# Patient Record
Sex: MALE | Race: WHITE | NOT HISPANIC OR LATINO
[De-identification: names, ages, dates, MRNs, and addresses within clinical notes are randomized per-mention and may not be internally consistent; named-entity substitution may affect disease eponyms.]

---

## 2023-01-03 PROBLEM — Z00.00 ENCOUNTER FOR PREVENTIVE HEALTH EXAMINATION: Status: ACTIVE | Noted: 2023-01-03

## 2023-01-11 ENCOUNTER — APPOINTMENT (OUTPATIENT)
Dept: ORTHOPEDIC SURGERY | Facility: CLINIC | Age: 78
End: 2023-01-11
Payer: MEDICARE

## 2023-01-11 VITALS — HEIGHT: 68 IN | BODY MASS INDEX: 35.16 KG/M2 | WEIGHT: 232 LBS

## 2023-01-11 DIAGNOSIS — E78.00 PURE HYPERCHOLESTEROLEMIA, UNSPECIFIED: ICD-10-CM

## 2023-01-11 DIAGNOSIS — S46.912A STRAIN OF UNSPECIFIED MUSCLE, FASCIA AND TENDON AT SHOULDER AND UPPER ARM LEVEL, LEFT ARM, INITIAL ENCOUNTER: ICD-10-CM

## 2023-01-11 PROCEDURE — 99213 OFFICE O/P EST LOW 20 MIN: CPT

## 2023-01-11 PROCEDURE — 73030 X-RAY EXAM OF SHOULDER: CPT | Mod: LT

## 2023-01-11 NOTE — PHYSICAL EXAM
[4 ___] : forward flexion 4[unfilled]/5 [4___] : abduction 4[unfilled]/5 [Left] : left shoulder [] : no erythema [5___] : internal rotation 5[unfilled]/5 [Type 3 acromion] : Type 3 acromion [TWNoteComboBox7] : active forward flexion 160 degrees [de-identified] : active abduction 110 degrees [TWNoteComboBox6] : internal rotation L5 [de-identified] : external rotation 55 degrees

## 2023-01-11 NOTE — ASSESSMENT
[FreeTextEntry1] : acute onset of left shoulder pain after a trip and fall down the stairs on 12/24/22.  reports to landing on the shoulder at that time.  impingement present.  concern for cuff tear.  narrowing SA space.  type 3 acromion.\par \par history of triple bypass 2019

## 2023-01-11 NOTE — DISCUSSION/SUMMARY
[de-identified] : Progress Note completed by Jaky Watts PA-C\par * Dr. Mcgraw -- The documentation recorded in this note accurately reflects the decisions made by me during this visit.

## 2023-01-11 NOTE — HISTORY OF PRESENT ILLNESS
[6] : 6 [2] : 2 [Dull/Aching] : dull/aching [Localized] : localized [Intermittent] : intermittent [Meds] : meds [de-identified] : 1/11/23:  acute onset of left shoulder pain after a trip and fall down the stairs on 12/24/22.  reports to landing on the shoulder at that time.  [] : no [FreeTextEntry1] : Lt shoulder [FreeTextEntry3] : 12/24/22 [FreeTextEntry5] : Pt reports he was going down the stairs and tripped landing on his left shoulder. Pt reports pain with ROM. denies prior shoulder issues in the past.  [de-identified] : elevation, raising arms above the head [de-identified] : [facility]  [de-identified] : XRAY

## 2023-01-15 ENCOUNTER — FORM ENCOUNTER (OUTPATIENT)
Age: 78
End: 2023-01-15

## 2023-01-16 ENCOUNTER — APPOINTMENT (OUTPATIENT)
Dept: MRI IMAGING | Facility: CLINIC | Age: 78
End: 2023-01-16
Payer: MEDICARE

## 2023-01-16 PROCEDURE — 73221 MRI JOINT UPR EXTREM W/O DYE: CPT | Mod: LT,MH

## 2023-01-25 ENCOUNTER — APPOINTMENT (OUTPATIENT)
Dept: ORTHOPEDIC SURGERY | Facility: CLINIC | Age: 78
End: 2023-01-25
Payer: MEDICARE

## 2023-01-25 VITALS — WEIGHT: 232 LBS | HEIGHT: 68 IN | BODY MASS INDEX: 35.16 KG/M2

## 2023-01-25 PROCEDURE — 99214 OFFICE O/P EST MOD 30 MIN: CPT

## 2023-01-25 NOTE — DATA REVIEWED
[MRI] : MRI [Left] : left [Shoulder] : shoulder [Report was reviewed and noted in the chart] : The report was reviewed and noted in the chart [I reviewed the films/CD and agree] : I reviewed the films/CD and agree

## 2023-01-25 NOTE — PHYSICAL EXAM
[4 ___] : forward flexion 4[unfilled]/5 [Left] : left shoulder [Type 3 acromion] : Type 3 acromion [] : tenderness at anterior shoulder [3___] : external rotation 3[unfilled]/5 [4___] : internal rotation 4[unfilled]/5 [TWNoteComboBox7] : active forward flexion 130 degrees [TWNoteComboBox4] : passive forward flexion 150 degrees [de-identified] : active abduction 150 degrees [TWNoteComboBox6] : internal rotation L5 [de-identified] : external rotation 30 degrees

## 2023-01-25 NOTE — HISTORY OF PRESENT ILLNESS
[6] : 6 [Throbbing] : throbbing [Retired] : Work status: retired [2] : 2 [Dull/Aching] : dull/aching [Localized] : localized [Intermittent] : intermittent [Meds] : meds [de-identified] : 1/11/23:  acute onset of left shoulder pain after a trip and fall down the stairs on 12/24/22.  reports to landing on the shoulder at that time. \par 1/25/23:  left shoulder pain persists. [] : no [FreeTextEntry1] : Lt shoulder [FreeTextEntry3] : 12/24/22 [FreeTextEntry5] : Pt reports he was going down the stairs and tripped landing on his left shoulder. Pt reports pain with ROM. denies prior shoulder issues in the past.  [de-identified] : elevation, raising arms above the head [de-identified] [facility]  [de-identified] : XRAY [de-identified] : pain meds

## 2023-01-25 NOTE — ASSESSMENT
[FreeTextEntry1] : acute onset of left shoulder pain after a trip and fall down the stairs on 12/24/22.  reports to landing on the shoulder at that time.  impingement present. mri 2023 shows retracted cuff tear,  dislocated biceps tendon.  looks like subscap tear as well.  discussed all options.  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  discussed failure rate and also discussed we have short window if we are going to try to fix.  risks of failure discussed.\par \par history of triple bypass 2019. on blood thinners.

## 2023-02-15 ENCOUNTER — APPOINTMENT (OUTPATIENT)
Dept: ORTHOPEDIC SURGERY | Facility: CLINIC | Age: 78
End: 2023-02-15
Payer: MEDICARE

## 2023-02-15 PROCEDURE — L3670: CPT | Mod: LT

## 2023-02-23 ENCOUNTER — LABORATORY RESULT (OUTPATIENT)
Age: 78
End: 2023-02-23

## 2023-02-27 ENCOUNTER — APPOINTMENT (OUTPATIENT)
Age: 78
End: 2023-02-27
Payer: MEDICARE

## 2023-02-27 PROCEDURE — 29826 SHO ARTHRS SRG DECOMPRESSION: CPT | Mod: LT

## 2023-02-27 PROCEDURE — 29827 SHO ARTHRS SRG RT8TR CUF RPR: CPT | Mod: LT

## 2023-02-27 RX ORDER — OXYCODONE AND ACETAMINOPHEN 5; 325 MG/1; MG/1
5-325 TABLET ORAL
Qty: 42 | Refills: 0 | Status: ACTIVE | COMMUNITY
Start: 2023-02-27 | End: 1900-01-01

## 2023-03-08 ENCOUNTER — APPOINTMENT (OUTPATIENT)
Dept: ORTHOPEDIC SURGERY | Facility: CLINIC | Age: 78
End: 2023-03-08
Payer: MEDICARE

## 2023-03-08 ENCOUNTER — NON-APPOINTMENT (OUTPATIENT)
Age: 78
End: 2023-03-08

## 2023-03-08 VITALS — WEIGHT: 232 LBS | HEIGHT: 68 IN | BODY MASS INDEX: 35.16 KG/M2

## 2023-03-08 PROCEDURE — 99024 POSTOP FOLLOW-UP VISIT: CPT

## 2023-03-08 NOTE — ASSESSMENT
[FreeTextEntry1] : s/p left shoulder scope for subscap repair, cuff repair, proximal biceps tenotomy, labral winsome, sad and bursectomy on 2/27/23.  doing well.

## 2023-03-08 NOTE — DISCUSSION/SUMMARY
[de-identified] : Progress Note completed by Jaky Watts PA-C\par * Dr. Mcgraw -- The documentation recorded in this note accurately reflects the decisions made by me during this visit.

## 2023-03-08 NOTE — HISTORY OF PRESENT ILLNESS
[5] : 5 [2] : 2 [Dull/Aching] : dull/aching [Sharp] : sharp [Occasional] : occasional [Rest] : rest [Meds] : meds [] : Post Surgical Visit: yes [de-identified] : 3/8/23:  s/p left shoulder scope on 2/27//23.   [FreeTextEntry1] : left shoulder [de-identified] : lifting [de-identified] : 02/27/23 [de-identified] : shoulder surgery

## 2023-04-05 ENCOUNTER — APPOINTMENT (OUTPATIENT)
Dept: ORTHOPEDIC SURGERY | Facility: CLINIC | Age: 78
End: 2023-04-05
Payer: MEDICARE

## 2023-04-05 VITALS — HEIGHT: 68 IN | BODY MASS INDEX: 35.16 KG/M2 | WEIGHT: 232 LBS

## 2023-04-05 DIAGNOSIS — M75.102 UNSPECIFIED ROTATOR CUFF TEAR OR RUPTURE OF LEFT SHOULDER, NOT SPECIFIED AS TRAUMATIC: ICD-10-CM

## 2023-04-05 PROCEDURE — 99024 POSTOP FOLLOW-UP VISIT: CPT

## 2023-04-05 NOTE — PHYSICAL EXAM
[Left] : left shoulder [] : no erythema [FreeTextEntry3] : well healed surgical scars [TWNoteComboBox4] : passive forward flexion 95 degrees [TWNoteComboBox9] : passive abduction 85 degrees [TWNoteComboBox6] : internal rotation lateral hip [de-identified] : external rotation 5 degrees

## 2023-04-05 NOTE — ASSESSMENT
[FreeTextEntry1] : s/p left shoulder scope for subscap repair, cuff repair, proximal biceps tenotomy, labral winsome, sad and bursectomy on 2/27/23.  improving with pt.

## 2023-04-05 NOTE — DISCUSSION/SUMMARY
[de-identified] : Progress note completed by Jaky Watts PA-C\par *Dr. Mcgraw - The TONE assigned on this date is under my supervision and saw this patient independently.  I have reviewed the note and agree with the treatment provided.

## 2023-04-05 NOTE — HISTORY OF PRESENT ILLNESS
[Gradual] : gradual [5] : 5 [0] : 0 [Sharp] : sharp [Occasional] : occasional [] : Post Surgical Visit: yes [de-identified] : 3/8/23:  s/p left shoulder scope on 2/27/23.  \par 4/5/23:  follow up left shoulder.  doing well.  improving with pt.  [FreeTextEntry1] : rt shoulder [de-identified] : 2/27

## 2023-05-10 ENCOUNTER — APPOINTMENT (OUTPATIENT)
Dept: ORTHOPEDIC SURGERY | Facility: CLINIC | Age: 78
End: 2023-05-10
Payer: MEDICARE

## 2023-05-10 VITALS — WEIGHT: 232 LBS | HEIGHT: 68 IN | BODY MASS INDEX: 35.16 KG/M2

## 2023-05-10 PROCEDURE — 99024 POSTOP FOLLOW-UP VISIT: CPT

## 2023-05-10 NOTE — PHYSICAL EXAM
[Left] : left shoulder [4 ___] : forward flexion 4[unfilled]/5 [3___] : abduction 3[unfilled]/5 [4___] : internal rotation 4[unfilled]/5 [] : fatigue with strength testing [FreeTextEntry3] : well healed surgical scars [TWNoteComboBox7] : active forward flexion 140 degrees [TWNoteComboBox4] : passive forward flexion 145 degrees [de-identified] : active abduction 95 degrees [TWNoteComboBox9] : passive abduction 85 degrees [TWNoteComboBox6] : internal rotation sacrum [de-identified] : external rotation 40 degrees

## 2023-05-10 NOTE — ASSESSMENT
[FreeTextEntry1] : s/p left shoulder scope for subscap repair, cuff repair, proximal biceps tenotomy, labral winsome, sad and bursectomy on 2/27/23.    improving with pt but still some weakness.

## 2023-05-10 NOTE — DISCUSSION/SUMMARY
[de-identified] : Progress Note completed by Jaky Watts PA-C\par * Dr. Mcgraw -- The documentation recorded in this note accurately reflects the decisions made by me during this visit.

## 2023-05-10 NOTE — HISTORY OF PRESENT ILLNESS
[] : Post Surgical Visit: yes [Sharp] : sharp [Occasional] : occasional [Rest] : rest [Gradual] : gradual [5] : 5 [0] : 0 [de-identified] : 3/8/23:  s/p left shoulder scope on 2/27/23.  \par 4/5/23:  follow up left shoulder.  doing well.  improving with pt. \par 5/10/23:  follow up left shoulder.  improving with pt but still some weakness.  [FreeTextEntry1] : rt shoulder [de-identified] : lifting [de-identified] : pt [de-identified] : 2/27 [de-identified] : L SHOULDER SCOPE

## 2023-06-14 ENCOUNTER — APPOINTMENT (OUTPATIENT)
Dept: ORTHOPEDIC SURGERY | Facility: CLINIC | Age: 78
End: 2023-06-14
Payer: MEDICARE

## 2023-06-14 VITALS — BODY MASS INDEX: 35.16 KG/M2 | WEIGHT: 232 LBS | HEIGHT: 68 IN

## 2023-06-14 PROCEDURE — 99213 OFFICE O/P EST LOW 20 MIN: CPT

## 2023-06-14 NOTE — DISCUSSION/SUMMARY
[de-identified] : Progress Note completed by Jaky Watts PA-C\par * Dr. Mcgraw -- The documentation recorded in this note accurately reflects the decisions made by me during this visit.

## 2023-06-14 NOTE — HISTORY OF PRESENT ILLNESS
[2] : 2 [1] : 2 [Dull/Aching] : dull/aching [Occasional] : occasional [Rest] : rest [Physical therapy] : physical therapy [de-identified] : 3/8/23:  s/p left shoulder scope on 2/27/23.  \par 4/5/23:  follow up left shoulder.  doing well.  improving with pt. \par 5/10/23:  follow up left shoulder.  improving with pt but still some weakness. \par 6/14/23:  left shoulder pain improved but still weakness and stiffness. [FreeTextEntry1] : left shoulder  [de-identified] : lifting [de-identified] : pt

## 2023-06-14 NOTE — PHYSICAL EXAM
[Left] : left shoulder [4 ___] : forward flexion 4[unfilled]/5 [] : no erythema [4___] : abduction 4[unfilled]/5 [5___] : internal rotation 5[unfilled]/5 [FreeTextEntry3] : well healed surgical scars [TWNoteComboBox7] : active forward flexion 140 degrees [de-identified] : active abduction 95 degrees [TWNoteComboBox4] : passive forward flexion 160 degrees [TWNoteComboBox6] : internal rotation L1 [de-identified] : external rotation 40 degrees

## 2023-07-19 ENCOUNTER — APPOINTMENT (OUTPATIENT)
Dept: ORTHOPEDIC SURGERY | Facility: CLINIC | Age: 78
End: 2023-07-19
Payer: MEDICARE

## 2023-07-19 VITALS — BODY MASS INDEX: 35.16 KG/M2 | WEIGHT: 232 LBS | HEIGHT: 68 IN

## 2023-07-19 PROCEDURE — 99213 OFFICE O/P EST LOW 20 MIN: CPT

## 2023-07-19 NOTE — ASSESSMENT
[FreeTextEntry1] : s/p left shoulder scope for subscap repair, cuff repair, proximal biceps tenotomy, labral winsome, sad and bursectomy on 2/27/23.    improving with pt but still some weakness and tightness.

## 2023-07-19 NOTE — HISTORY OF PRESENT ILLNESS
[Dull/Aching] : dull/aching [2] : 2 [1] : 2 [Occasional] : occasional [Rest] : rest [Physical therapy] : physical therapy [de-identified] : 3/8/23:  s/p left shoulder scope on 2/27/23.  \par 4/5/23:  follow up left shoulder.  doing well.  improving with pt. \par 5/10/23:  follow up left shoulder.  improving with pt but still some weakness. \par 6/14/23:  left shoulder pain improved but still weakness and stiffness.\par 7/19/23:  left shoulder improving with pt.  [FreeTextEntry1] : left shoulder  [de-identified] : lifting [de-identified] : pt

## 2023-07-19 NOTE — DISCUSSION/SUMMARY
[de-identified] : Progress Note completed by Jaky Watts PA-C\par * Dr. Mcgraw -- The documentation recorded in this note accurately reflects the decisions made by me during this visit.

## 2023-07-19 NOTE — PHYSICAL EXAM
[Left] : left shoulder [4 ___] : forward flexion 4[unfilled]/5 [4___] : abduction 4[unfilled]/5 [5___] : internal rotation 5[unfilled]/5 [] : no tenderness to palpation [FreeTextEntry3] : well healed surgical scars [TWNoteComboBox7] : active forward flexion 160 degrees [TWNoteComboBox4] : passive forward flexion 165 degrees [de-identified] : active abduction 140 degrees [TWNoteComboBox6] : internal rotation L1 [de-identified] : external rotation 55 degrees

## 2023-08-30 ENCOUNTER — APPOINTMENT (OUTPATIENT)
Dept: ORTHOPEDIC SURGERY | Facility: CLINIC | Age: 78
End: 2023-08-30
Payer: MEDICARE

## 2023-08-30 VITALS — HEIGHT: 68 IN | BODY MASS INDEX: 35.16 KG/M2 | WEIGHT: 232 LBS

## 2023-08-30 PROCEDURE — 99213 OFFICE O/P EST LOW 20 MIN: CPT

## 2023-08-30 NOTE — PHYSICAL EXAM
[Left] : left shoulder [4 ___] : forward flexion 4[unfilled]/5 [4___] : abduction 4[unfilled]/5 [5___] : internal rotation 5[unfilled]/5 [] : no tenderness to palpation [FreeTextEntry3] : well healed surgical scars [TWNoteComboBox7] : active forward flexion 165 degrees [TWNoteComboBox4] : passive forward flexion 165 degrees [de-identified] : active abduction 140 degrees [TWNoteComboBox6] : internal rotation L3 [de-identified] : external rotation 40 degrees

## 2023-08-30 NOTE — HISTORY OF PRESENT ILLNESS
[2] : 2 [Dull/Aching] : dull/aching [Physical therapy] : physical therapy [Retired] : Work status: retired [1] : 2 [Occasional] : occasional [Rest] : rest [de-identified] : 3/8/23:  s/p left shoulder scope on 2/27/23.   4/5/23:  follow up left shoulder.  doing well.  improving with pt.  5/10/23:  follow up left shoulder.  improving with pt but still some weakness.  6/14/23:  left shoulder pain improved but still weakness and stiffness. 7/19/23:  left shoulder improving with pt.  8/30/23: improvement but still some stiffness. [] : no [FreeTextEntry1] : left shoulder  [de-identified] : lifting [de-identified] : pt

## 2023-10-25 ENCOUNTER — APPOINTMENT (OUTPATIENT)
Dept: ORTHOPEDIC SURGERY | Facility: CLINIC | Age: 78
End: 2023-10-25
Payer: MEDICARE

## 2023-10-25 VITALS — WEIGHT: 232 LBS | HEIGHT: 68 IN | BODY MASS INDEX: 35.16 KG/M2

## 2023-10-25 DIAGNOSIS — M75.42 IMPINGEMENT SYNDROME OF LEFT SHOULDER: ICD-10-CM

## 2023-10-25 DIAGNOSIS — M75.102 UNSPECIFIED ROTATOR CUFF TEAR OR RUPTURE OF LEFT SHOULDER, NOT SPECIFIED AS TRAUMATIC: ICD-10-CM

## 2023-10-25 PROCEDURE — 99213 OFFICE O/P EST LOW 20 MIN: CPT

## 2024-02-13 ENCOUNTER — NON-APPOINTMENT (OUTPATIENT)
Age: 79
End: 2024-02-13

## 2024-02-14 ENCOUNTER — NON-APPOINTMENT (OUTPATIENT)
Age: 79
End: 2024-02-14

## 2024-02-14 ENCOUNTER — APPOINTMENT (OUTPATIENT)
Dept: PULMONOLOGY | Facility: CLINIC | Age: 79
End: 2024-02-14
Payer: MEDICARE

## 2024-02-14 VITALS
TEMPERATURE: 97.7 F | HEART RATE: 81 BPM | OXYGEN SATURATION: 94 % | DIASTOLIC BLOOD PRESSURE: 83 MMHG | WEIGHT: 240 LBS | SYSTOLIC BLOOD PRESSURE: 135 MMHG | HEIGHT: 68 IN | BODY MASS INDEX: 36.37 KG/M2

## 2024-02-14 PROCEDURE — 99204 OFFICE O/P NEW MOD 45 MIN: CPT | Mod: 25

## 2024-02-14 PROCEDURE — 94010 BREATHING CAPACITY TEST: CPT

## 2024-02-14 RX ORDER — AMLODIPINE BESYLATE 5 MG/1
5 TABLET ORAL
Qty: 90 | Refills: 3 | Status: ACTIVE | COMMUNITY
Start: 2024-02-14 | End: 1900-01-01

## 2024-02-14 NOTE — CONSULT LETTER
[Dear  ___] : Dear  [unfilled], [FreeTextEntry1] : I had the pleasure of evaluating your patient, VIGNESH FAN , in the office today.  Please review my consultation and evaluation report that follows below.  Please do not hesitate to call me if further information is necessary or if you wish to discuss ongoing care or diagnostic work-up.    I very much appreciate your referral and it is a privilege to be able to provide care for your patient.  Sincerely,   Chandler Blue MD, MHCM, FACP, LEEANN-C Pulmonary Medicine  of Medicine [facility] at [facility] [email or web address] -Pulmonary in [address] [place]  [address]    Fax   [facility] at [address]  [phone]

## 2024-02-14 NOTE — PROCEDURE
[FreeTextEntry1] : Brigid: Possible mild restriction, no suggestion of obstruction on brigid FEV1%  86% FEV1 2.41 87%

## 2024-02-14 NOTE — ASSESSMENT
[FreeTextEntry1] : 79 yo man referred by Dr Garces for cough since June 2023  Had left shoulder surgery in Feb 23, cruise in May, then began to have cough, nonprod, intermittent, not at night or supine CXR negative in June  In October saw Dr Garces again for cough, recommended stop ramipril Patient not clear as to how long he stopped it but didnt think cessation was effective By Jan, back on Ramipril for HT and repeat CXR was negative Referred here  Hx HT, CAD and s/p CABG in 2/20 at [place] after cath Hx HLD on rosuvastatin and exetimibe FH+ for CAD in father and family Dr Bajaw is cardiology Possible history of mild GERD but hasnt seen GI S/p Basal cell face Meds ramipril, Metoprolol 25 rosuvastatin, ezet.  Nonsmoker, no history COVID, no allergies No asthma, no COPD, no pneumonia  Imp 79 yo man with cough, nonproductive for about 8 months No identifiable pulmonary or sinus disease or allergy history No evidence of cardiac decompensation Mild suggestion of GERD in past Hx CAD CABG, HT and HLD Agree that ramipril is still most likely cause of cough and would give another trial of discontinuation Resume amlodipine 5 mg for BP and f/u with Dr Garces for BP check--he will see cardiology in 4 months Patient wants to try this prior to considering trial of PPI for reflux or GI evaluation--this would be the next step

## 2024-02-14 NOTE — HISTORY OF PRESENT ILLNESS
[TextBox_4] : 77 yo man referred by Dr Garces for cough since June 2023  Had left shoulder surgery in Feb 23, cruise in May, then began to have cough, nonprod, intermittent, not at night or supine CXR negative in June  In October saw Dr Garces again for cough, recommended stop ramipril Patient not clear as to how long he stopped it but didnt think cessation was effective By Jan, back on Ramipril for HT and repeat CXR was negative Referred here  Hx HT, CAD and s/p CABG in 2/20 at [place] after cath Hx HLD on rosuvastatin and exetimibe Dr Bajwa is cardiology Possible history of mild GERD but hasnt seen GI S/p Basal cell face Meds ramipril, Metoprolol 25 rosuvastatin, ezet.  Nonsmoker, no history COVID, no allergies No asthma, no COPD, no pneumonia

## 2024-04-17 ENCOUNTER — APPOINTMENT (OUTPATIENT)
Dept: PULMONOLOGY | Facility: CLINIC | Age: 79
End: 2024-04-17
Payer: MEDICARE

## 2024-04-17 VITALS
BODY MASS INDEX: 37.13 KG/M2 | HEART RATE: 68 BPM | TEMPERATURE: 97.6 F | OXYGEN SATURATION: 95 % | HEIGHT: 68 IN | DIASTOLIC BLOOD PRESSURE: 81 MMHG | WEIGHT: 245 LBS | SYSTOLIC BLOOD PRESSURE: 131 MMHG

## 2024-04-17 DIAGNOSIS — R05.9 COUGH, UNSPECIFIED: ICD-10-CM

## 2024-04-17 DIAGNOSIS — R06.83 SNORING: ICD-10-CM

## 2024-04-17 PROCEDURE — 99214 OFFICE O/P EST MOD 30 MIN: CPT

## 2024-04-17 RX ORDER — OMEPRAZOLE 40 MG/1
40 CAPSULE, DELAYED RELEASE ORAL
Qty: 90 | Refills: 2 | Status: ACTIVE | COMMUNITY
Start: 2024-04-17 | End: 1900-01-01

## 2024-04-17 NOTE — ASSESSMENT
[FreeTextEntry1] : 79 yo man referred by Dr Garces for cough since June 2023 Had left shoulder surgery in Feb 23, cruise in May, then began to have cough, nonprod, intermittent, not at night or supine CXR negative in June In October saw Dr Garces again for cough, recommended stop ramipril Patient not clear as to how long he stopped it but didnt think cessation was effective By Jan, back on Ramipril for HT and repeat CXR was negative Referred here Hx HT, CAD and s/p CABG in 2/20 at [place] after cath Hx HLD on rosuvastatin and exetimibe Dr Bajwa is cardiology Possible history of mild GERD but hasnt seen GI S/p Basal cell face Meds ramipril, Metoprolol 25 rosuvastatin, ezet. Nonsmoker, no history COVID, no allergies No asthma, no COPD, no pneumonia.  Interim: Still off the ramipril and cough is a bit better but not gone He did not yet start the amlodipine  Now tells me about signficant snoring His daughter suggests that he has intermittent brreathing as well Wants a sleep eval  Imp 79 yo man with GERD, CAD CABG HT and HLD Persistent cough Would definitiely give trial of omeprazole at this time In view of snoring, ++Mallimpati and his irreguular breathing, agree that sleep study is indicated RTC 3 months

## 2024-04-17 NOTE — HISTORY OF PRESENT ILLNESS
[TextBox_4] : 77 yo man referred by Dr Garces for cough since June 2023 Had left shoulder surgery in Feb 23, cruise in May, then began to have cough, nonprod, intermittent, not at night or supine CXR negative in June In October saw Dr Garces again for cough, recommended stop ramipril Patient not clear as to how long he stopped it but didnt think cessation was effective By Jan, back on Ramipril for HT and repeat CXR was negative Referred here Hx HT, CAD and s/p CABG in 2/20 at [place] after cath Hx HLD on rosuvastatin and exetimibe Dr Bajwa is cardiology Possible history of mild GERD but hasnt seen GI S/p Basal cell face Meds ramipril, Metoprolol 25 rosuvastatin, ezet. Nonsmoker, no history COVID, no allergies No asthma, no COPD, no pneumonia.  Interim: Still off the ramipril and cough is a bit better but not gone He did not yet start the amlodipine  Now tells me about signficant snoring His daughter suggests that he has intermittent brreathing as well Wants a sleep eval

## 2024-06-14 ENCOUNTER — OUTPATIENT (OUTPATIENT)
Dept: OUTPATIENT SERVICES | Facility: HOSPITAL | Age: 79
LOS: 1 days | End: 2024-06-14
Payer: MEDICARE

## 2024-06-14 DIAGNOSIS — G47.33 OBSTRUCTIVE SLEEP APNEA (ADULT) (PEDIATRIC): ICD-10-CM

## 2024-06-14 PROCEDURE — G0400: CPT | Mod: 26

## 2024-06-14 PROCEDURE — 95800 SLP STDY UNATTENDED: CPT

## 2024-06-18 ENCOUNTER — APPOINTMENT (OUTPATIENT)
Dept: PULMONOLOGY | Facility: CLINIC | Age: 79
End: 2024-06-18
Payer: MEDICARE

## 2024-06-18 VITALS
HEIGHT: 68 IN | RESPIRATION RATE: 16 BRPM | SYSTOLIC BLOOD PRESSURE: 132 MMHG | BODY MASS INDEX: 37.28 KG/M2 | WEIGHT: 246 LBS | DIASTOLIC BLOOD PRESSURE: 80 MMHG

## 2024-06-18 DIAGNOSIS — I10 ESSENTIAL (PRIMARY) HYPERTENSION: ICD-10-CM

## 2024-06-18 DIAGNOSIS — Z86.79 PERSONAL HISTORY OF OTHER DISEASES OF THE CIRCULATORY SYSTEM: ICD-10-CM

## 2024-06-18 DIAGNOSIS — E66.9 OBESITY, UNSPECIFIED: ICD-10-CM

## 2024-06-18 DIAGNOSIS — G47.33 OBSTRUCTIVE SLEEP APNEA (ADULT) (PEDIATRIC): ICD-10-CM

## 2024-06-18 PROCEDURE — 99214 OFFICE O/P EST MOD 30 MIN: CPT

## 2024-06-18 PROCEDURE — G2211 COMPLEX E/M VISIT ADD ON: CPT

## 2024-06-18 RX ORDER — IBUPROFEN 600 MG/1
600 TABLET, FILM COATED ORAL TWICE DAILY
Qty: 60 | Refills: 2 | Status: DISCONTINUED | COMMUNITY
Start: 2023-01-11 | End: 2024-06-18

## 2024-06-18 NOTE — HISTORY OF PRESENT ILLNESS
[FreeTextEntry1] : C/O snoring, witnessed apneas, EDS with ESS 9. Difficulty staying awake in the afternoon.  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   Seeing Dr Blue for cough.  Hx HTN. CAD, CABG. Sees cardio.  BMI 37.  Retired . [Daytime Somnolence] : daytime somnolence [Date: ___] : Date of most recent diagnostic polysomnogram: [unfilled] [AHI: ___ per hour] : Apnea-hypopnea index:  [unfilled] per hour [T90%: ___] : T90%: [unfilled]% [ESS] : 9

## 2024-06-18 NOTE — PLAN
[TextEntry] : Reviewed treatments for sleep apnea - reviewed CPAP as first line therapy; reviewed OA, surgical options mainly Inspire. Will try CPAP. CPAP titration study. Reviewed use of CPAP. Reviewed with the patient the short and long term health consequences of untreated GAVI. Risk include, but not limited to, HTN, heart disease, stroke, MVAs. Weight loss. Cardio f/u. F/U with Dr Blue.

## 2024-06-18 NOTE — REVIEW OF SYSTEMS
[Negative] : Musculoskeletal [FreeTextEntry3] : per HPI [FreeTextEntry8] : per HPI [FreeTextEntry9] : per HPI [de-identified] : per HPI

## 2024-06-18 NOTE — PHYSICAL EXAM
[Normal Oropharynx] : abnormal oropharynx [Low Lying Soft Palate] : low lying soft palate [Elongated Uvula] : elongated uvula [Enlarged Base of the Tongue] : enlargement of the base of the tongue [III] : III [Heart Rate And Rhythm] : heart rate was normal and rhythm regular [Heart Sounds] : normal S1 and S2 [] : no respiratory distress [Respiration, Rhythm And Depth] : normal respiratory rhythm and effort [Exaggerated Use Of Accessory Muscles For Inspiration] : no accessory muscle use [Auscultation Breath Sounds / Voice Sounds] : lungs were clear to auscultation bilaterally [Skin Color & Pigmentation] : normal skin color and pigmentation [Oriented To Time, Place, And Person] : oriented to person, place, and time [Impaired Insight] : insight and judgment were intact [Affect] : the affect was normal

## 2024-07-03 ENCOUNTER — OUTPATIENT (OUTPATIENT)
Dept: OUTPATIENT SERVICES | Facility: HOSPITAL | Age: 79
LOS: 1 days | End: 2024-07-03
Payer: MEDICARE

## 2024-07-03 DIAGNOSIS — G47.33 OBSTRUCTIVE SLEEP APNEA (ADULT) (PEDIATRIC): ICD-10-CM

## 2024-07-03 PROCEDURE — 95811 POLYSOM 6/>YRS CPAP 4/> PARM: CPT | Mod: 26

## 2024-07-03 PROCEDURE — 95811 POLYSOM 6/>YRS CPAP 4/> PARM: CPT

## 2024-08-09 ENCOUNTER — APPOINTMENT (OUTPATIENT)
Dept: PULMONOLOGY | Facility: CLINIC | Age: 79
End: 2024-08-09

## 2024-11-11 ENCOUNTER — RX RENEWAL (OUTPATIENT)
Age: 79
End: 2024-11-11

## 2024-11-19 ENCOUNTER — APPOINTMENT (OUTPATIENT)
Dept: PULMONOLOGY | Facility: CLINIC | Age: 79
End: 2024-11-19
Payer: MEDICARE

## 2024-11-19 VITALS — OXYGEN SATURATION: 97 %

## 2024-11-19 DIAGNOSIS — G47.33 OBSTRUCTIVE SLEEP APNEA (ADULT) (PEDIATRIC): ICD-10-CM

## 2024-11-19 DIAGNOSIS — E66.9 OBESITY, UNSPECIFIED: ICD-10-CM

## 2024-11-19 PROCEDURE — 99214 OFFICE O/P EST MOD 30 MIN: CPT

## 2024-11-19 PROCEDURE — G2211 COMPLEX E/M VISIT ADD ON: CPT

## 2024-11-19 RX ORDER — METOPROLOL TARTRATE 75 MG/1
TABLET, FILM COATED ORAL
Refills: 0 | Status: ACTIVE | COMMUNITY

## 2024-11-19 RX ORDER — ROSUVASTATIN CALCIUM 5 MG/1
TABLET, FILM COATED ORAL
Refills: 0 | Status: ACTIVE | COMMUNITY

## 2024-11-19 RX ORDER — RAMIPRIL 5 MG/1
CAPSULE ORAL
Refills: 0 | Status: ACTIVE | COMMUNITY

## 2024-12-11 ENCOUNTER — OUTPATIENT (OUTPATIENT)
Dept: OUTPATIENT SERVICES | Facility: HOSPITAL | Age: 79
LOS: 1 days | End: 2024-12-11
Payer: MEDICARE

## 2024-12-11 DIAGNOSIS — G47.33 OBSTRUCTIVE SLEEP APNEA (ADULT) (PEDIATRIC): ICD-10-CM

## 2024-12-11 PROCEDURE — 95811 POLYSOM 6/>YRS CPAP 4/> PARM: CPT | Mod: 26

## 2024-12-11 PROCEDURE — 95811 POLYSOM 6/>YRS CPAP 4/> PARM: CPT

## 2025-01-21 ENCOUNTER — APPOINTMENT (OUTPATIENT)
Dept: PULMONOLOGY | Facility: CLINIC | Age: 80
End: 2025-01-21